# Patient Record
Sex: MALE | Race: WHITE | NOT HISPANIC OR LATINO | Employment: FULL TIME | URBAN - METROPOLITAN AREA
[De-identification: names, ages, dates, MRNs, and addresses within clinical notes are randomized per-mention and may not be internally consistent; named-entity substitution may affect disease eponyms.]

---

## 2021-09-24 ENCOUNTER — OFFICE VISIT (OUTPATIENT)
Dept: FAMILY MEDICINE | Facility: CLINIC | Age: 66
End: 2021-09-24
Payer: COMMERCIAL

## 2021-09-24 VITALS
RESPIRATION RATE: 16 BRPM | WEIGHT: 196.8 LBS | DIASTOLIC BLOOD PRESSURE: 84 MMHG | SYSTOLIC BLOOD PRESSURE: 136 MMHG | HEART RATE: 80 BPM | BODY MASS INDEX: 29.83 KG/M2 | TEMPERATURE: 97.7 F | HEIGHT: 68 IN | OXYGEN SATURATION: 97 %

## 2021-09-24 DIAGNOSIS — M25.551 POSTERIOR PAIN OF HIP, RIGHT: Primary | ICD-10-CM

## 2021-09-24 PROCEDURE — 99203 OFFICE O/P NEW LOW 30 MIN: CPT | Performed by: NURSE PRACTITIONER

## 2021-09-24 ASSESSMENT — MIFFLIN-ST. JEOR: SCORE: 1652.18

## 2021-09-24 NOTE — PATIENT INSTRUCTIONS
1.  Make an appointment at radiology in [place] for MRI of right hip.  2.  I will contact you with results.

## 2021-09-24 NOTE — PROGRESS NOTES
"  Assessment & Plan     Posterior pain of hip, right  Most likely groin and posterior tendon strain but concerned for non healing partial tendon tear.  MRI ordered due to longevity of symptoms despite Physical Therapy and conservative measures.  - MR Hip Right w/o Contrast; Future    See Patient Instructions    Return if symptoms worsen or fail to improve.    Nury Gamboa NP  [facility]    Cristobal Lee is a 66 year old who presents for the following health issues;     HPI     Groin Pain       Duration: 4 weeks     Description (location/character/radiation): Groin area right side     Intensity:  moderate, 8/10, hurts more when he is sitting     Accompanying signs and symptoms: Patient states he was running to get out of the rain 4 weeks ago and pulled a hamstring.  He is seeing physical therapy for that, but the groin hurts more so when he is sitting     History (similar episodes/previous evaluation): None    Precipitating or alleviating factors: None    Therapies tried and outcome: aspirin seems to help a little     He feels that this is scrotal but may be more deeper.  He is concerned because this is the same symptoms he saw with hydrocele as well but no scrotal pain.    Review of Systems   CONSTITUTIONAL: NEGATIVE for fever, chills, change in weight  RESP: NEGATIVE for significant cough or SOB  CV: NEGATIVE for chest pain, palpitations or peripheral edema  MUSCULOSKELETAL: POSITIVE  for posterior lower hip pain  :  Pain that radiates up to the groin, feels like when he had a hydrocele/hernia last time  PSYCHIATRIC: NEGATIVE for changes in mood or affect  ROS otherwise negative      Objective    /84   Pulse 80   Temp 97.7  F (36.5  C) (Tympanic)   Resp 16   Ht 1.727 m (5' 8\")   Wt 89.3 kg (196 lb 12.8 oz)   SpO2 97%   BMI 29.92 kg/m    Body mass index is 29.92 kg/m .  Physical Exam   GENERAL: healthy, alert and no distress  MS: RLE exam shows normal strength and " muscle mass, no deformities, no erythema, induration, or nodules, no evidence of joint effusion, ROM of all joints is normal and no evidence of joint instability  :  Scrotum is normal with no swelling or tenderness, no hernia on examination  PSYCH: mentation appears normal, affect normal/bright

## 2021-10-05 ENCOUNTER — HOSPITAL ENCOUNTER (OUTPATIENT)
Dept: MRI IMAGING | Facility: CLINIC | Age: 66
Discharge: HOME | End: 2021-10-05
Attending: NURSE PRACTITIONER | Admitting: NURSE PRACTITIONER
Payer: COMMERCIAL

## 2021-10-05 DIAGNOSIS — S76.311A TEAR OF RIGHT HAMSTRING: Primary | ICD-10-CM

## 2021-10-05 DIAGNOSIS — M25.551 POSTERIOR PAIN OF HIP, RIGHT: ICD-10-CM

## 2021-10-05 PROCEDURE — 73721 MRI JNT OF LWR EXTRE W/O DYE: CPT | Mod: RT

## 2021-10-05 PROCEDURE — 73721 MRI JNT OF LWR EXTRE W/O DYE: CPT | Mod: 26 | Performed by: RADIOLOGY

## 2021-12-28 ENCOUNTER — OFFICE VISIT (OUTPATIENT)
Dept: FAMILY MEDICINE | Facility: CLINIC | Age: 66
End: 2021-12-28
Payer: COMMERCIAL

## 2021-12-28 VITALS
HEIGHT: 68 IN | HEART RATE: 80 BPM | SYSTOLIC BLOOD PRESSURE: 129 MMHG | TEMPERATURE: 96.2 F | OXYGEN SATURATION: 98 % | RESPIRATION RATE: 16 BRPM | WEIGHT: 192 LBS | BODY MASS INDEX: 29.1 KG/M2 | DIASTOLIC BLOOD PRESSURE: 90 MMHG

## 2021-12-28 DIAGNOSIS — Z12.11 SPECIAL SCREENING FOR MALIGNANT NEOPLASMS, COLON: Primary | ICD-10-CM

## 2021-12-28 DIAGNOSIS — Z00.00 PREVENTATIVE HEALTH CARE: ICD-10-CM

## 2021-12-28 DIAGNOSIS — Z13.1 SCREENING FOR DIABETES MELLITUS: ICD-10-CM

## 2021-12-28 DIAGNOSIS — Z28.20 VACCINE REFUSED BY PATIENT: ICD-10-CM

## 2021-12-28 LAB
ANION GAP SERPL CALCULATED.3IONS-SCNC: 4 MMOL/L (ref 3–14)
BUN SERPL-MCNC: 23 MG/DL (ref 7–30)
CALCIUM SERPL-MCNC: 9.4 MG/DL (ref 8.5–10.1)
CHLORIDE BLD-SCNC: 107 MMOL/L (ref 94–109)
CO2 SERPL-SCNC: 28 MMOL/L (ref 20–32)
CREAT SERPL-MCNC: 1.03 MG/DL (ref 0.66–1.25)
GFR SERPL CREATININE-BSD FRML MDRD: 81 ML/MIN/1.73M2
GLUCOSE BLD-MCNC: 98 MG/DL (ref 70–99)
POTASSIUM BLD-SCNC: 4 MMOL/L (ref 3.4–5.3)
SODIUM SERPL-SCNC: 139 MMOL/L (ref 133–144)

## 2021-12-28 PROCEDURE — 99397 PER PM REEVAL EST PAT 65+ YR: CPT | Performed by: NURSE PRACTITIONER

## 2021-12-28 PROCEDURE — 80048 BASIC METABOLIC PNL TOTAL CA: CPT | Performed by: NURSE PRACTITIONER

## 2021-12-28 PROCEDURE — 36415 COLL VENOUS BLD VENIPUNCTURE: CPT | Performed by: NURSE PRACTITIONER

## 2021-12-28 ASSESSMENT — MIFFLIN-ST. JEOR: SCORE: 1630.41

## 2021-12-28 ASSESSMENT — ACTIVITIES OF DAILY LIVING (ADL): CURRENT_FUNCTION: NO ASSISTANCE NEEDED

## 2021-12-28 NOTE — LETTER
December 28, 2021      Jesus Mcmanus  [address] [address]        Dear ,    We are writing to inform you of your test results.    Labs normal   No sign of diabetes mellitus       Resulted Orders   Basic metabolic panel  (Ca, Cl, CO2, Creat, Gluc, K, Na, BUN)   Result Value Ref Range    Sodium 139 133 - 144 mmol/L    Potassium 4.0 3.4 - 5.3 mmol/L    Chloride 107 94 - 109 mmol/L    Carbon Dioxide (CO2) 28 20 - 32 mmol/L    Anion Gap 4 3 - 14 mmol/L    Urea Nitrogen 23 7 - 30 mg/dL    Creatinine 1.03 0.66 - 1.25 mg/dL    Calcium 9.4 8.5 - 10.1 mg/dL    Glucose 98 70 - 99 mg/dL    GFR Estimate 81 >60 mL/min/1.73m2      Comment:      Effective December 21, 2021 eGFRcr in adults is calculated using the 2021 CKD-EPI creatinine equation which includes age and gender (Vanda et al., NEJM, DOI: 10.1056/TUNMgn1055904)       If you have any questions or concerns, please call the clinic at the number listed above.       Sincerely,      CARROL Pena CNP

## 2021-12-28 NOTE — PROGRESS NOTES
"SUBJECTIVE:   Jesus Mcmanus is a 66 year old male who presents for Preventive Visit.    Patient has been advised of split billing requirements and indicates understanding: Yes   Are you in the first 12 months of your Medicare coverage?  No    Healthy Habits:    In general, how would you rate your overall health?  Very good    Frequency of exercise:  6-7 days/week    Duration of exercise:: walks 15,000-20,000 steps.    Do you usually eat at least 4 servings of fruit and vegetables a day, include whole grains    & fiber and avoid regularly eating high fat or \"junk\" foods?  Yes    Taking medications regularly:  Not Applicable    Barriers to taking medications:  None    Medication side effects:  Not applicable    Ability to successfully perform activities of daily living:  No assistance needed    Home Safety:  No safety concerns identified    Hearing Impairment:  No hearing concerns (would like to have hearing checked at some point.)    In the past 6 months, have you been bothered by leaking of urine?  No    In general, how would you rate your overall mental or emotional health?  Very good      PHQ-2 Total Score:    Additional concerns today:  No    Do you feel safe in your environment? Yes    Have you ever done Advance Care Planning? (For example, a Health Directive, POLST, or a discussion with a medical provider or your loved ones about your wishes): No, advance care planning information given to patient to review.  Patient plans to discuss their wishes with loved ones or provider.       Fall risk  Fallen 2 or more times in the past year?: No  Any fall with injury in the past year?: No    Cognitive Screening   1) Repeat 3 items (Leader, Season, Table)    2) Clock draw: NORMAL  3) 3 item recall: Recalls 2 objects   Results: NORMAL clock, 1-2 items recalled: COGNITIVE IMPAIRMENT LESS LIKELY    Mini-CogTM Copyright S Eduardo. Licensed by the author for use in [facility]; reprinted with permission " "(kylee@[facility]). All rights reserved.      Do you have sleep apnea, excessive snoring or daytime drowsiness?: no    Reviewed and updated as needed this visit by clinical staff                Reviewed and updated as needed this visit by Provider               Social History     Tobacco Use     Smoking status: Never Smoker     Smokeless tobacco: Never Used   Substance Use Topics     Alcohol use: Yes     Comment: rare       No flowsheet data found.        PROBLEMS TO ADD ON...  Patient had eye exam and was told he had a \" spot of blood in his eye and needed to get tested for diabetes mellitus\".   Patient denies family history of diabetes mellitus.     Current providers sharing in care for this patient include:   Patient Care Team:  Nurys Lacey MD as PCP - General    The following health maintenance items are reviewed in Epic and correct as of today:  Health Maintenance Due   Topic Date Due     ANNUAL REVIEW OF HM ORDERS  Never done     ADVANCE CARE PLANNING  Never done     COVID-19 Vaccine (1) Never done     COLORECTAL CANCER SCREENING  Never done     HIV SCREENING  Never done     HEPATITIS C SCREENING  Never done     DTAP/TDAP/TD IMMUNIZATION (1 - Tdap) Never done     LIPID  Never done     ZOSTER IMMUNIZATION (1 of 2) Never done     MEDICARE ANNUAL WELLNESS VISIT  06/12/2021     FALL RISK ASSESSMENT  Never done     Pneumococcal Vaccine: 65+ Years (1 of 1 - PPSV23) Never done     AORTIC ANEURYSM SCREENING (SYSTEM ASSIGNED)  06/12/2021     INFLUENZA VACCINE (1) Never done     BP Readings from Last 3 Encounters:   12/28/21 (!) 129/90   09/24/21 136/84   08/15/13 146/81    Wt Readings from Last 3 Encounters:   12/28/21 87.1 kg (192 lb)   09/24/21 89.3 kg (196 lb 12.8 oz)   08/15/13 90.7 kg (200 lb)                  Pneumonia Vaccine:Adults age 65+ who received Pneumovax (PPSV23) at 65 years or older: Should be given PCV13 > 1 year after their most recent PPSV23        Review of Systems  Constitutional, HEENT, " "cardiovascular, pulmonary, GI, , musculoskeletal, neuro, skin, endocrine and psych systems are negative, except as otherwise noted.    OBJECTIVE:   There were no vitals taken for this visit. Estimated body mass index is 29.92 kg/m  as calculated from the following:    Height as of 9/24/21: 1.727 m (5' 8\").    Weight as of 9/24/21: 89.3 kg (196 lb 12.8 oz).  Physical Exam  GENERAL: healthy, alert and no distress  EYES: Eyes grossly normal to inspection, PERRL and conjunctivae and sclerae normal  HENT: ear canals and TM's normal, nose and mouth without ulcers or lesions  NECK: no adenopathy, no asymmetry, masses, or scars and thyroid normal to palpation  RESP: lungs clear to auscultation - no rales, rhonchi or wheezes  CV: regular rate and rhythm, normal S1 S2, no S3 or S4, no murmur, click or rub, no peripheral edema and peripheral pulses strong  ABDOMEN: soft, nontender, no hepatosplenomegaly, no masses and bowel sounds normal  MS: no gross musculoskeletal defects noted, no edema  SKIN: no suspicious lesions or rashes  NEURO: Normal strength and tone, mentation intact and speech normal  PSYCH: mentation appears normal, affect normal/bright    Diagnostic Test Results:  Labs reviewed in Epic    ASSESSMENT / PLAN:   (Z12.11) Special screening for malignant neoplasms, colon  (primary encounter diagnosis)  Comment:   Plan: Fecal colorectal cancer screen (FIT)            (Z13.1) Screening for diabetes mellitus  Comment:   Plan: Basic metabolic panel  (Ca, Cl, CO2, Creat,         Gluc, K, Na, BUN)            (Z00.00) Preventative health care  Comment:   Plan:     (Z28.20) Vaccine refused by patient  Comment:   Plan: patient refused all vaccines today    Patient has been advised of split billing requirements and indicates understanding: Yes  COUNSELING:  Reviewed preventive health counseling, as reflected in patient instructions       Regular exercise       Healthy diet/nutrition       Immunizations    Declined: " "Influenza, Pneumococcal, TDAP and Zoster due to Concerns about side effects/safety               Hepatitis C screening       Colon cancer screening       Prostate cancer screening    Estimated body mass index is 29.92 kg/m  as calculated from the following:    Height as of 9/24/21: 1.727 m (5' 8\").    Weight as of 9/24/21: 89.3 kg (196 lb 12.8 oz).        He reports that he has never smoked. He has never used smokeless tobacco.      Appropriate preventive services were discussed with this patient, including applicable screening as appropriate for cardiovascular disease, diabetes, osteopenia/osteoporosis, and glaucoma.  As appropriate for age/gender, discussed screening for colorectal cancer, prostate cancer, breast cancer, and cervical cancer. Checklist reviewing preventive services available has been given to the patient.    Reviewed patients plan of care and provided an AVS. The Basic Care Plan (routine screening as documented in Health Maintenance) for Jesus meets the Care Plan requirement. This Care Plan has been established and reviewed with the Patient.    Counseling Resources:  ATP IV Guidelines  Pooled Cohorts Equation Calculator  Breast Cancer Risk Calculator  Breast Cancer: Medication to Reduce Risk  FRAX Risk Assessment  ICSI Preventive Guidelines  Dietary Guidelines for Americans, 2010  [employer]'s MyPlate  ASA Prophylaxis  Lung CA Screening    CARROL Pena CNP  [facility]    Identified Health Risks:  "

## 2022-02-22 ENCOUNTER — TRANSCRIBE ORDERS (OUTPATIENT)
Dept: OTHER | Age: 67
End: 2022-02-22
Payer: COMMERCIAL

## 2022-02-22 DIAGNOSIS — R10.2 PELVIC AND PERINEAL PAIN: ICD-10-CM

## 2022-02-22 DIAGNOSIS — R10.2 PAIN IN PELVIS: Primary | ICD-10-CM

## 2022-03-24 ENCOUNTER — HOSPITAL ENCOUNTER (OUTPATIENT)
Dept: PHYSICAL THERAPY | Facility: CLINIC | Age: 67
Setting detail: THERAPIES SERIES
Discharge: HOME | End: 2022-03-24
Attending: UROLOGY
Payer: COMMERCIAL

## 2022-03-24 DIAGNOSIS — R10.2 PELVIC AND PERINEAL PAIN: ICD-10-CM

## 2022-03-24 DIAGNOSIS — R10.2 PAIN IN PELVIS: ICD-10-CM

## 2022-03-24 PROCEDURE — 97162 PT EVAL MOD COMPLEX 30 MIN: CPT | Mod: GP | Performed by: PHYSICAL THERAPIST

## 2022-03-24 PROCEDURE — 97110 THERAPEUTIC EXERCISES: CPT | Mod: GP | Performed by: PHYSICAL THERAPIST

## 2022-03-25 NOTE — PROGRESS NOTES
Physical Therapy Initial Pelvic Floor Muscle Evaluation     03/24/22 1300   General Information   Type of Visit Initial OP Ortho PT Evaluation   Start of Care Date 03/24/22   Referring Physician Claudio Wilkinson MD   Patient/Family Goals Statement Get rid pelvic pain   Orders Evaluate and Treat   Date of Order 02/21/22   Certification Required? No   Medical Diagnosis Pelvic and Perineal Pain   Surgical/Medical history reviewed Yes   Precautions/Limitations no known precautions/limitations   General Information Comments PMHx: Hydrocele repair (1968), RCT (2010), appendicitis with Peritonitis - appendectomy 2013; (L) LE fx   Body Part(s)   Body Part(s) Pelvic Floor Dysfunction;Hip   Presentation and Etiology   Pertinent history of current problem (include personal factors and/or comorbidities that impact the POC) Pt states 21 yr h/o pelvic pain. 8/2021 ruptured his HS doing a quick run to get out of the rain.  This made pelvic pain worse at the same time. Had PT for this and got better.  Pain is typically coming on a few hours to a day or so after having coitus.  Met with a urologist and was referred to PT.    Impairments A. Pain   Functional Limitations perform desired leisure / sports activities   Symptom Location Pelvic Floor Muscle, posterior (R) hip   How/Where did it occur From insidious onset   Onset date of current episode/exacerbation 02/21/22   Chronicity Chronic   Best (/10) 0   Worst (/10) 4   Pain quality C. Aching   Frequency of pain/symptoms C. With activity   Pain/symptoms are: The same all the time   Pain/symptoms exacerbated by A. Sitting;M. Other   Pain exacerbation comment Sexual intercourse can bring on pain, but is not every time   Pain/symptoms eased by K. Other   Pain eased by comment Time   Progression of symptoms since onset: Unchanged   Current Level of Function   Current Community Support Family/friend caregiver   Patient role/employment history A. Employed   Employment Comments [employer],  "[employer]   Living environment [place] [place]   Home/community accessibility Not bothered by steps or ladders   Fall Risk Screen   Fall screen completed by PT   Have you fallen 2 or more times in the past year? No   Have you fallen and had an injury in the past year? Yes   Is patient a fall risk? No   Fall screen comments Situational fell in the rain running; hamstring rupture   Abuse Screen (yes response referral indicated)   Feels Unsafe at Home or Work/School no   Feels Threatened by Someone no   Does Anyone Try to Keep You From Having Contact with Others or Doing Things Outside Your Home? no   Physical Signs of Abuse Present no   Hip Objective Findings   Side (if bilateral, select both right and left) Right   Observation No acute distress behaviors demonstrated   Integumentary  Mobile incision s/p Hydrocele repair as a child, umbillicus scar from appy; all mobile and no feeling of \"drawing/pulling\" with mobility assessment   Posture WFL   Gait/Locomotion WFL   Hip ROM Comments All AROM/PROM WNL at (B) hips   Lumbar ROM Limited flexion d/t pulls HS and \"at my LB\". (L) SB causes pain (R) Lumbar paraspinals, but \"only a pull\" with (R) SB. Minimal rotation noted   Pelvic Screen WNL in sagittal plane, mild transverse rotation noted with (L) ASIS prominence   Hip/Knee Strength Comments (L) = 5/5 all motions tested   Hip Flexibility Comments (R) decreased overall flexibility vs (L)   Scour Test neg   TOMA Test neg   FADIR Test neg   Hip Special Tests Comments Unable to reproduce any pain today   Neurological Testing Comments Deferred based on sx report   Palpation No c/o tenderness (R) LQ, (R) Coccygeus or STP    Right Hip Flexion Strength 5-/5   Right Knee Flexion Strength 5/5   Right Knee Extension Strength 5/5   Luis Felipe Flexibility Test Mild tension (R) vs (L)   Right Hamstring Flexibility Tight   Right Piriformis Flexibility Tight   Pelvic Floor Dysfunction Questions   Regular exercise Yes   Fluid " "intake-glasses/day (one glass/cup = 8oz Water = 24oz/day, Kefir = 16oz/day   Caffeinated beverages-glasses/day Tea Walod = 16oz/day,    Alcoholic beverages - glasses/day Rare   Recent diet change? No   How long can you delay the need to urinate?  30 mins   How many times do you wake to urinate at night?   1   How often do you urinate during the day?   3-4 hours   Can you stop the flow of urine when on the toilet?  Yes   Is the volume of urine passed usually  Medium   Do you have the sensation that you need to go to the toilet?  Yes   Do you empty your bladder frequently, before you experience the urge to pass urine?  No   Do you have \"triggers\" that make you feel you can't wait to go to the toilet?  No   Number of bladder infections last year?  0   Frequency of bowel movements:  Daily    Consistency of stool?    (BSC 4)   Do you ignore the urge to defecate?  No   Pelvic Floor Dysfunction Objective Findings   Areas of Tightness Hamstrings;Piriformis   Abdominal Wall Scar mobility   Type of Emptying Problem strain to void  (double void)   Pelvic Floor Dysfunction Comments SL external palpation only today, no replication of symptoms   Scar mobility Good mobility at (L) LQ Hydrocele scar and at umbillicus scar from appy   Planned Therapy Interventions   Planned Therapy Interventions joint mobilization;manual therapy;motor coordination training;neuromuscular re-education;strengthening;stretching   Planned Modality Interventions   Planned Modality Interventions Biofeedback;Electrical stimulation   Clinical Impression   Criteria for Skilled Therapeutic Interventions Met yes, treatment indicated   PT Diagnosis Impaired function at the PFM and (R) hip   Influenced by the following impairments pain, mild weakness, poor knowledge of function of the PFM, poor coordination of PFM for elimination per symptom report   Functional limitations due to impairments post coitus pain/aching   Clinical Presentation Unstable/Unpredictable " "  Clinical Presentation Rationale longstanding h/o pain, unpredictable pattern to pain, multiple comorbidities   Clinical Decision Making (Complexity) Moderate complexity   Therapy Frequency 1 time/week   Predicted Duration of Therapy Intervention (days/wks) 8 weeks for up to 8 sessions   Risk & Benefits of therapy have been explained Yes   Patient, Family & other staff in agreement with plan of care Yes   Pelvic Health Informed Consent Statement Discussed with patient/guardian reason for referral regarding pelvic health needs and external/internal pelvic floor muscle examination.  Opportunity provided to ask questions and verbal consent for assessment and intervention was given.   Clinical Impression Comments Pt presents with primary c/o 20 yrs of post coitus pain.  Does not limit him physically, but would like to know what it is and if can get rid of it.  Has had hydrocele repair and appy as abdominal s/p surgeries.  Pt could benefit from skilled PT to improve flexibility, and to learn improved function to PFM to meet goals.    Education Assessment   Preferred Learning Style Listening;Reading;Demonstration;Pictures/video   Barriers to Learning No barriers   Ortho Goal 1   Goal Identifier STG   Goal Description 1) Pt will improve PFM control to report < 3x per month that stream will stop/start during urination, in 4 weeks.    Target Date 04/21/22   Ortho Goal 2   Goal Identifier STG   Goal Description 2)Pt will report \"good\" stream with every void in 4 weeks.    Target Date 04/21/22   Ortho Goal 3   Goal Identifier LTG   Goal Description 3)Pt jose report 2/10 or less pain post coitus in 6 weeks.    Target Date 05/06/22   Ortho Goal 4   Goal Identifier LTG   Goal Description 4)Pt will report no straining to urinate in 8 weeks.    Target Date 05/19/22   Ortho Goal 5   Goal Identifier LTG   Goal Description 5)Pt will be indep in HEP to progress toward painfree coitus in 8 weeks    Target Date 05/19/22   Total " Evaluation Time   PT Eval, Moderate Complexity Minutes (26447) 25   Thank you for the referral of this patient.  Lali Marsh, PT, MA  #[id number]

## 2022-04-18 ENCOUNTER — HOSPITAL ENCOUNTER (OUTPATIENT)
Dept: PHYSICAL THERAPY | Facility: CLINIC | Age: 67
Setting detail: THERAPIES SERIES
Discharge: HOME | End: 2022-04-18
Attending: UROLOGY
Payer: COMMERCIAL

## 2022-04-18 PROCEDURE — 97140 MANUAL THERAPY 1/> REGIONS: CPT | Mod: GP | Performed by: PHYSICAL THERAPIST

## 2022-04-18 PROCEDURE — 97110 THERAPEUTIC EXERCISES: CPT | Mod: GP,59 | Performed by: PHYSICAL THERAPIST

## 2022-04-18 PROCEDURE — 90912 BFB TRAINING 1ST 15 MIN: CPT | Mod: GP | Performed by: PHYSICAL THERAPIST

## 2022-05-02 ENCOUNTER — HOSPITAL ENCOUNTER (OUTPATIENT)
Dept: PHYSICAL THERAPY | Facility: CLINIC | Age: 67
Setting detail: THERAPIES SERIES
Discharge: HOME | End: 2022-05-02
Attending: UROLOGY
Payer: COMMERCIAL

## 2022-05-02 PROCEDURE — 97140 MANUAL THERAPY 1/> REGIONS: CPT | Mod: GP | Performed by: PHYSICAL THERAPIST

## 2022-05-02 PROCEDURE — 97110 THERAPEUTIC EXERCISES: CPT | Mod: GP | Performed by: PHYSICAL THERAPIST

## 2022-06-07 ENCOUNTER — HOSPITAL ENCOUNTER (OUTPATIENT)
Dept: PHYSICAL THERAPY | Facility: CLINIC | Age: 67
Setting detail: THERAPIES SERIES
Discharge: HOME | End: 2022-06-07
Attending: UROLOGY
Payer: COMMERCIAL

## 2022-06-07 PROCEDURE — 97110 THERAPEUTIC EXERCISES: CPT | Mod: GP | Performed by: PHYSICAL THERAPIST

## 2022-06-07 PROCEDURE — 97140 MANUAL THERAPY 1/> REGIONS: CPT | Mod: GP | Performed by: PHYSICAL THERAPIST

## 2022-06-08 NOTE — PROGRESS NOTES
"*This note serves as the Discharge Note, as this is last known status, and pt did not return.      Physical Therapy Progress Note    Patient Name: Jesus Mcmanus  MR#: [id number]  : 1955  MD name: London Wilkinson MD  Pt seen from: 3/24/22 to 22  Diagnosis: Pelvic Pain         22 0800   Signing Clinician's Name / Credentials   Signing clinician's name / credentials Lali Marsh, PT MA #[id number]   Session Number   Session Number  (Pref One [place] Adv)   Authorization status Auth'd [id number]   Progress Note/Recertification   Progress Note Due Date 22   Progress Note Completed Date 22   Ortho Goal 1   Goal Identifier STG   Goal Description 1) Pt will improve PFM control to report < 3x per month that stream will stop/start during urination, in 4 weeks.    Goal Progress *Did not address today - recheck in 2 weeks.   Target Date 22   Ortho Goal 2   Goal Identifier STG   Goal Description 2)Pt will report \"good\" stream with every void in 4 weeks.    Goal Progress *Did not address today - recheck in 2 weeks.   Target Date 22   Ortho Goal 3   Goal Identifier LTG   Goal Description 3)Pt jose report 2/10 or less pain post coitus in 6 weeks.    Goal Progress Met: pt states not directly painful but 10-12 hours \"post-coitus\" and states may be \"masked by the HS pain.\"  (cont for 4 weeks)   Target Date 22   Date Met 22   Ortho Goal 4   Goal Identifier LTG   Goal Description 4)Pt will report no straining to urinate in 8 weeks.    Target Date 22   Goal Progress Met: pt states he doesn't feel this is an issue any longer.   Date Met 22   Ortho Goal 5   Goal Identifier LTG   Goal Description 5)Pt will be indep in HEP to progress toward painfree coitus in 8 weeks    Target Date 22   Goal Progress Onogoing and updated each session.   Ortho Goal 6   Goal Identifier LTG   Goal Description 6)Pt will be able to ride his motorcycle or sit in car for > 60 mins without \"HS " "pain\", in 4 weeks.   Target Date 07/05/22   Subjective Report   Subjective Report Feels he is a bit better.  Notes his (R) HS is \"the source\" of a lot of this pain. 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 Will ride his motorcycle and have to stop every 1 hour d/t pain. Sitting in the truck will get pain and need to stop every 45 mins.   Objective Measure 1   Objective Measure Urination/Void Pattern   Details Did not report any difficulties with urination patterns today.   Objective Measure 2   Objective Measure Pain   Details Last had pain, \"unable to remember\".   Objective Measure 3   Objective Measure Palpation   Details Tender at ischial tuberosity (medial aspect) and STP   Therapeutic Procedure/exercise   Therapeutic Procedures: strength, endurance, ROM, flexibillity minutes (73196) 15   Skilled Intervention Exer: stretching; progression of HEP   Patient Response Pt states HS stretching is helping his pain.   Treatment Detail Reviewed HS stretch and given in supine doorway position as an option - maybe better stretch when not sitting on the ischial tuberosity while stretching.  Reviewed adductor standing stretch and kneeling HF stretch. Gave and had pt try towel roll positioning under (L) ischial tuberosity as he states he sits \"harder\" on his (R) butt bone vs (L).   Manual Therapy   Manual Therapy: Mobilization, MFR, MLD, friction massage minutes (07205) 30   Skilled Intervention MT: STM, mobs, nn mobilization   Patient Response Mild irritation in sitting now after MT today.   Treatment Detail Prone sacrotuberous release (R), SIJ PA mobs with Gr II pressures. STM to pirformis mm and at HS origin (R). Pudendal nn glides at (R)distal/lateral sacral border. (L) SL for STM to STP and coccygeus mm (R), and pudendal nn glides medial to ischial tuberosity.   Plan   Homework [id number]   Home program Cont with current HEP.   Plan for next session Cont with POC for 4 more weeks. Possibly retry BF " for PFM relaxation (?).  Advance HEP as needed.   Pelvic Health Only: Informed Consent   Pelvic Health Informed Consent Statement Discussed with patient/guardian reason for referral regarding pelvic health needs and external/internal pelvic floor muscle examination.  Opportunity provided to ask questions and verbal consent for assessment and intervention was given.   Total Session Time   Timed Code Treatment Minutes 45 (TE,2MT)   Total Treatment Time (sum of timed and untimed services) 45 (TE,2MT)   Thank you for the referral of this patient.  Lali Marsh, PT, MA  #[id number]

## 2022-12-12 ENCOUNTER — TELEPHONE (OUTPATIENT)
Dept: FAMILY MEDICINE | Facility: CLINIC | Age: 67
End: 2022-12-12

## 2022-12-12 NOTE — TELEPHONE ENCOUNTER
Patient Quality Outreach    Patient is due for the following:   Colon Cancer Screening    Next Steps:   pt to schedule    Type of outreach:    Sent letter.      Questions for provider review:    None     Andrew Perea

## 2022-12-12 NOTE — LETTER
[facility]  [address]  [facility] [address]  Phone: [phone]    December 12, 2022    To  Jesus Mcmanus  [address]  [facility] [address]    Your team at [facility] cares about your health. We have reviewed your chart and based on our findings; we are making the following recommendations to better manage your health.     You are in particular need of attention regarding the following:     Call or MyChart message your clinic to schedule a colonoscopy, schedule/ a FIT Test, or order a Cologuard test. If you are unsure what type of test you need, please call your clinic and speak to clinic staff.   Colon cancer is now the second leading cause of cancer-related deaths in the [place] for both men and women and there are over 130,000 new cases and 50,000 deaths per year from colon cancer. Colonoscopies can prevent 90-95% of these deaths. Problem lesions can be removed before they ever become cancer. This test is not only looking for cancer, but also getting rid of precancerous lesions.   If you are under/uninsured, we recommend you contact the [employer] [facility].[employer] is a free colorectal cancer screening program that provides colonoscopies for eligible under/uninsured [place] men and women. If you are interested in receiving a free colonoscopy, please call [employer] at t [phone] (mention code [employer]) to see if you're eligible. Please have them send us the results.   Please call [phone] to schedule a colonoscopy.    If you have already completed these items, please contact the clinic via phone or   [employer] so your care team can review and update your records. Thank you for   choosing [facility] for your healthcare needs. For any questions,   concerns, or to schedule an appointment please contact our clinic.    Healthy Regards,      Your [facility] Care Team